# Patient Record
Sex: MALE | Race: WHITE | NOT HISPANIC OR LATINO | URBAN - METROPOLITAN AREA
[De-identification: names, ages, dates, MRNs, and addresses within clinical notes are randomized per-mention and may not be internally consistent; named-entity substitution may affect disease eponyms.]

---

## 2024-12-20 ENCOUNTER — OFFICE VISIT (OUTPATIENT)
Dept: URGENT CARE | Age: 34
End: 2024-12-20

## 2024-12-20 VITALS
SYSTOLIC BLOOD PRESSURE: 150 MMHG | RESPIRATION RATE: 16 BRPM | OXYGEN SATURATION: 97 % | DIASTOLIC BLOOD PRESSURE: 92 MMHG | TEMPERATURE: 97.6 F

## 2024-12-20 DIAGNOSIS — L05.91 PILONIDAL CYST: Primary | ICD-10-CM

## 2024-12-20 DIAGNOSIS — R03.0 ELEVATED BLOOD PRESSURE READING: ICD-10-CM

## 2024-12-20 RX ORDER — DOXYCYCLINE HYCLATE 100 MG
100 TABLET ORAL 2 TIMES DAILY
Qty: 20 TABLET | Refills: 0 | Status: SHIPPED | OUTPATIENT
Start: 2024-12-20 | End: 2024-12-30

## 2024-12-20 NOTE — PATIENT INSTRUCTIONS
Abscess PLAN:    1. Take all antibiotics as prescribed  -Keep area clean and dry. Do not soak. It is ok to shower.  2. Follow up with your PCP or the wound care clinic for reevaluation 3 to 5 days.  3. Return to the emergency department or urgent care for new or worsening symptoms.    While in the office today the abscess was first cleaned. Sub-Q Lidocaine was used to numb the area. A small incision was made with scalpel expressing purulent and bloody discharge. Wound dressed with bacitracin and adhesive bandage.          DISCHARGE INSTRUCTIONS:  Return to the urgent care or emergency department if:  -You have worsening purulent, mucus-like discharge coming from the wound  -You have red or pink streaking from the site  -You have a fever or chills.  -Worsening redness, swelling.  -You have new or worsening pain, or pain that does not get better with medicine.  -You have questions or concerns about your condition or care.      Abscess:    Skin abscesses are warm, painful, pus-filled pockets of infection below the skin surface that may occur on any body surface. Abscesses may be one to several inches in diameter. A skin abscess may go away with application of warm compresses. Otherwise, a doctor treats an abscess by cutting it open and draining the pus. If the abscess is completely drained, antibiotics usually are not needed. However, if the person has a weakened immune system, the infection has spread into nearby skin , the person has many abscesses, or the abscess is on the middle or upper part of the face, antibiotics that kill staphylococci, such as dicloxacillin and cephalexin, are given. If doctors suspect MRSA is the cause, antibiotics that kill that organism, such as trimethoprim with sulfamethoxazole, clindamycin, or doxycycline, are given.

## 2024-12-20 NOTE — PROGRESS NOTES
Subjective   Patient ID: Tato Ace is a 34 y.o. male. They present today with a chief complaint of Rash.    CC: Abscess    HPI: Patient presenting for concerns of a abscess of the left upper buttocks.  Area is painful with a little weeping.  No abdominal pain, nausea, vomiting.  No anal pain.  No fever.  No other concerns or complaints.    Past Medical History  Allergies as of 12/20/2024    (No Known Allergies)       (Not in a hospital admission)      History reviewed. No pertinent past medical history.    Past Surgical History:   Procedure Laterality Date    APPENDECTOMY  07/27/2016    Appendectomy    MOUTH SURGERY  07/27/2016    Oral Surgery Tooth Extraction            Review of Systems  Review of Systems      After reviewing all body systems I have documented pertinent findings above in the history.  All other Systems reviewed and are negative for complaint.  Pertinent positive and negatives are listed in the above HPI.      Objective    Vitals:    12/20/24 1653   BP: (!) 150/92   Resp: 16   Temp: 36.4 °C (97.6 °F)   SpO2: 97%     No LMP for male patient.  Physical Exam    General: Alert, oriented, and cooperative.  No acute distress.  No tripoding, nasal flaring, drooling, or stridor. Well developed, well nourished.     Skin: Left upper gluteal cleft is positive for a quarter sized abscess.  Lesion is fluctuant, soft, mildly erythematous and tender to touch.  No weeping or discharge.  Skin is otherwise warm, and dry. Appropriate color for ethnicity. No busies, purpura or petechiae. No lymphangitis, angioedema, edema,  necrosis, bullae, or modeling.  Negative Nikolsky sign. No pain out of proportion.     Eyes: Sclera and conjunctivae normal     Mouth/Throat: No respiratory compromise, tripoding, drooling, muffled voice, stridor, or trismus.  No strawberry tongue.  No angioedema of the lips or tongue.    Cardiac: Regular rate    Respiratory:  No acute respiratory distress.  Regular rate of breathing.       Abdomen: Bowel sounds are normal.  No distention.  Soft, nontender.      Incision and Drainage    Date/Time: 12/20/2024 5:29 PM    Performed by: Renny Fitzpatrick PA-C  Authorized by: Renny Fitzpatrick PA-C    Consent:     Consent obtained:  Verbal    Consent given by:  Patient    Risks, benefits, and alternatives were discussed: yes      Risks discussed:  Bleeding, incomplete drainage and pain    Alternatives discussed:  Referral  [place] protocol:     Procedure explained and questions answered to patient or proxy's satisfaction: yes      Patient identity confirmed:  Verbally with patient  Location:     Type:  Abscess    Size:  Quarter sized    Location:  Lower extremity    Lower extremity location:  Buttock    Buttock location:  L buttock  Pre-procedure details:     Skin preparation:  Povidone-iodine  Sedation:     Sedation type:  None  Anesthesia:     Anesthesia method:  Local infiltration    Local anesthetic:  Lidocaine 2% w/o epi  Procedure type:     Complexity:  Simple  Procedure details:     Incision types:  Single straight    Incision depth:  Subcutaneous    Wound management:  Probed and deloculated    Drainage:  Bloody and purulent    Drainage amount:  Moderate    Wound treatment:  Wound left open    Packing materials:  None  Post-procedure details:     Procedure completion:  Tolerated well, no immediate complications      Point of Care Test & Imaging Results from this visit    No results found.    Diagnostic study results (if any) were reviewed by Renny Fitzpatrick PA-C.    Assessment/Plan   Allergies, medications, history, and pertinent labs/EKGs/Imaging reviewed by Renny Fitzpatrick PA-C.     MDM:  Exam findings positive for abscess/pilonidal cyst of the upper left gluteal cleft. Patient is in no acute distress. Does not appear systemically ill or toxic. Skin is intact. No lymphangitis. No clinical findings suggestive of sepsis, drug reaction/hypersensitivity reaction, extensive cellulitis, gout,  or necrotizing fasciitis.      Procedure Note:  The lesion/abscess was cleaned with Betadine and sterile water. Sub-Q Lidocaine used to numb the area. Small incision was made with scalpel expressing purulent and bloody discharge. Wound was probed and explored with hemostat, and irrigated with saline. Wound dressed with bacitracin and adhesive bandage.  Pt. tolerated procedure well, stating relief of pressure. Pt discharged home d/t good condition. Advised follow-up with PCP within two days for reevaluation and resolution. Pt/family instructed to return if symptoms worsen or if new symptoms develop. Patient/family expressed understanding and consented to the above plan. No barriers of communication were apparent and I answered all questions.    Discussed elevated blood pressure reading from today's visit.  Advise monitoring, recording, and follow-up with PCP.    Orders and Diagnoses  Diagnoses and all orders for this visit:  Pilonidal cyst  -     doxycycline (Vibra-Tabs) 100 mg tablet; Take 1 tablet (100 mg) by mouth 2 times a day for 10 days. Take with a full glass of water and do not lie down for at least 30 minutes after.  -     Referral to Wound Clinic; Future  Elevated blood pressure reading  Other orders  -     Incision and Drainage      Medical Admin Record      Patient disposition: Home    Electronically signed by Renny Fitzpatrick PA-C  5:32 PM